# Patient Record
(demographics unavailable — no encounter records)

---

## 2025-02-21 NOTE — HISTORY OF PRESENT ILLNESS
[FreeTextEntry1] : Normally takes Adderall approximately 5 days per week when working his job to keep focus.    Transthoracic echocardiogram from 2/8/2022 showed normal cardiac chamber sizes with normal systolic function of the left ventricle and normal ejection fraction of 60 to 65%.  There was borderline enlargement of the left atrium, and right atrium.  There was trace MR and PI and no pericardial effusion;

## 2025-02-21 NOTE — REASON FOR VISIT
[Symptom and Test Evaluation] : symptom and test evaluation [Arrhythmia/ECG Abnorrmalities] : arrhythmia/ECG abnormalities [FreeTextEntry3] : N A [FreeTextEntry1] : The patient is a 35-year-old young man who has a history for occasional intermittent palpitations in the chest experienced when he's been seen over the past many years.  Admits to having anxiety but has never been treated for this.  He's also had a longtime history with ADHD for which he's been taking Adderall for many years.  He's on NO other prescription medications.  Back in January 2022, he had a COVID-19 infection for which lasted several days and he gradually improved.  Subsequently he had noticed a few fleeting palpitations back then but no associated chest pain, shortness of breath, dizziness or syncope;  Patient returns to the office today for a general cardiac checkup after being lost to follow up for almost 3 years.    Unfortunately, he's not been feeling very good since late December with complaints of occasional right sided chest discomfort that sometimes radiates across his chest to the left side.  This can come on while at rest twisting and turning his upper body at home, and sometimes it comes on with minimal exertion.   He's also noticed an increase in dyspnea on exertion when exercising at the gym and when walking up the stairs from the subways in [place], [place].  He's also noticed a warm feeling come over him and his legs feel weakened while commuting to [facility] but admits these typically come on when he has an anxiety attack and/or feeling stressed.    He admits that his symptoms appear to have improved some after holding his Adderall since late December 2024, as well as stopped drinking 3 cups of coffee daily, stopped drinking Red bulls in the afternoon, and stopped smoking cigarettes (normally smokes about 5 cigs daily).  However, his symptoms are still present during these last few weeks and it's beginning to concern him.  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    Fortunately, there's been no associated diaphoresis, orthopnea, PND, syncope, edema.

## 2025-02-21 NOTE — REVIEW OF SYSTEMS
[Negative] : Heme/Lymph [Dyspnea on exertion] : dyspnea during exertion [Chest Discomfort] : chest discomfort [Palpitations] : palpitations [Anxiety] : anxiety [Under Stress] : under stress

## 2025-02-21 NOTE — PHYSICAL EXAM
[Well Developed] : well developed [Well Nourished] : well nourished [No Acute Distress] : no acute distress [Normal Conjunctiva] : normal conjunctiva [Normal Venous Pressure] : normal venous pressure [No Carotid Bruit] : no carotid bruit [Normal S1, S2] : normal S1, S2 [No Murmur] : no murmur [No Rub] : no rub [No Gallop] : no gallop [Clear Lung Fields] : clear lung fields [Good Air Entry] : good air entry [No Respiratory Distress] : no respiratory distress  [Soft] : abdomen soft [Non Tender] : non-tender [No Masses/organomegaly] : no masses/organomegaly [Normal Gait] : normal gait [No Edema] : no edema [No Cyanosis] : no cyanosis [No Clubbing] : no clubbing [No Rash] : no rash [No Skin Lesions] : no skin lesions [Moves all extremities] : moves all extremities [No Focal Deficits] : no focal deficits [Normal Speech] : normal speech [Alert and Oriented] : alert and oriented [Normal memory] : normal memory [Appears Anxious] : appears anxious [de-identified] : Well fit muscular male with large chest/shoulders.

## 2025-03-21 NOTE — HISTORY OF PRESENT ILLNESS
[FreeTextEntry1] : Normally takes Adderall approximately 5 days per week when working his job to keep focus.    Exercise Stress Test (March 6, 2025):  EKG negative for ischemia.  Patient exercised 13 METS which is consistent with good exercise capacity, and testing was stopped due to patient fatigue.  No ischemic ST segment changes during stress.  Rare PAC noted during stress; ie- negative study.   Transthoracic Echocardiogram (March 6, 2025):  LV cavity and wall thickness are normal size.  Normal LV systolic and diastolic function; LVEF 55 to 60%.  No regional wall motion abnormalities.  Left and right atria normal in size and structure.  No significant valvular disease.  No pericardial effusion seen.    Transthoracic echocardiogram from 2/8/2022 showed normal cardiac chamber sizes with normal systolic function of the left ventricle and normal ejection fraction of 60 to 65%.  There was borderline enlargement of the left atrium, and right atrium.  There was trace MR and PI and no pericardial effusion;

## 2025-03-21 NOTE — REASON FOR VISIT
[Symptom and Test Evaluation] : symptom and test evaluation [Arrhythmia/ECG Abnorrmalities] : arrhythmia/ECG abnormalities [FreeTextEntry3] : N A [FreeTextEntry1] : The patient is a 35-year-old young man who has a history for occasional intermittent palpitations in the chest experienced when he's been seen over the past many years.  Admits to having anxiety but has never been treated for this.  He's also had a longtime history with ADHD for which he's been taking Adderall for many years.  He's on NO other prescription medications.  Back in January 2022, he had a COVID-19 infection for which lasted several days and he gradually improved.  Subsequently he had noticed a few fleeting palpitations back then but no associated chest pain, shortness of breath, dizziness or syncope.    Patient returns to the office today for a general cardiac checkup, and to review results from most recent EST and TTE.     Fortunately, he's now been feeling better since his last office visit, whereas he's no longer experiencing as much occasional right sided chest discomfort that previously would radiate across his chest. This had come on while at rest twisting and turning his upper body at home, and sometimes it would come on with minimal exertion.   He's also noticed an increase in dyspnea on exertion when exercising at the gym and when walking up the stairs from the subways in [place], [place].  He's also noticed a warm feeling come over him and his legs feel weakened while commuting to [facility] but admits these typically come on when he has an anxiety attack and/or feeling stressed.    Today, patient now believes his symptoms are mostly anxiety/stress related, and he's planning on making an appointment with his PCP to discuss treatment options.    Fortunately, there's been no associated diaphoresis, orthopnea, PND, syncope, edema.

## 2025-03-21 NOTE — ASSESSMENT
[FreeTextEntry1] : EKG shows normal sinus rhythm, rate 63 bpm, rSR prime V1.  Essentially unchanged.   In summary, this 35-year-old young man who has a history for occasional intermittent palpitations in the chest experienced when he's been seen over the past many years.  Admits to having anxiety but has never been treated for this.  He's also had a longtime history with ADHD for which he's been taking Adderall for many years.  He's on NO other prescription medications.  Back in January 2022, he had a COVID-19 infection for which lasted several days and he gradually improved.  Subsequently he had noticed a few fleeting palpitations back then but no associated chest pain, shortness of breath, dizziness or syncope.    Patient returns to the office today for a general cardiac checkup, and to review results from most recent EST and TTE.     Fortunately, he's now been feeling better since his last office visit, whereas he's no longer experiencing as much occasional right sided chest discomfort that previously would radiate across his chest. This had come on while at rest twisting and turning his upper body at home, and sometimes it would come on with minimal exertion.   He's also noticed an increase in dyspnea on exertion when exercising at the gym and when walking up the stairs from the subways in [place], [place].  He's also noticed a warm feeling come over him and his legs feel weakened while commuting to [facility] but admits these typically come on when he has an anxiety attack and/or feeling stressed.    Today, patient now believes his symptoms are mostly anxiety/stress related, and he's planning on making an appointment with his PCP to discuss treatment options.    Fortunately, there's been no associated diaphoresis, orthopnea, PND, syncope, edema.   Normally takes Adderall approximately 5 days per week when working his job to keep focus.    Exercise Stress Test (March 6, 2025):  EKG negative for ischemia.  Patient exercised 13 METS which is consistent with good exercise capacity, and testing was stopped due to patient fatigue.  No ischemic ST segment changes during stress.  Rare PAC noted during stress; ie- negative study.   Transthoracic Echocardiogram (March 6, 2025):  LV cavity and wall thickness are normal size.  Normal LV systolic and diastolic function; LVEF 55 to 60%.  No regional wall motion abnormalities.  Left and right atria normal in size and structure.  No significant valvular disease.  No pericardial effusion seen.    Plan:  Patient reassured his cardiac testing modalities were unremarkable showing no signs of coronary ischemic/infarct as well as no exercise induced arrhythmias.  In addition, his echocardiogram was also unremarkable with a normal cardiac structure and function as well as no significant valvulopathy or any signs of inflammation.   Recommend patient to continue cutting back on all triggers such as Adderall, coffee and other simulants such as Red Bull, and continue with stopping cigarettes.  Keep well hydrated daily with water.   Counseled patient on importance of exercising regularly to help reduce stress/anxiety and to promote overall general health.  He will be increasing his weekly aerobic exercise activity.   Follow up with PCP (Dr. Henning) regarding routine checkups and fasting blood work including lipid panel.  Forward all testing/lab work to our office. He will be discussing potential anti-anxiety medications with his PCP in the future to hopefully reduce some of those symptoms.  (?) SSRI's, therapy, psychiatrist, etc.   Patient to report any recurrence of significant chest symptoms, shortness of breath or palpitations in the future;  Return to office with Dr. Rivera within 1 year or PRN.

## 2025-03-21 NOTE — PHYSICAL EXAM
[Well Developed] : well developed [Well Nourished] : well nourished [No Acute Distress] : no acute distress [Normal Conjunctiva] : normal conjunctiva [Normal Venous Pressure] : normal venous pressure [No Carotid Bruit] : no carotid bruit [Normal S1, S2] : normal S1, S2 [No Murmur] : no murmur [No Rub] : no rub [No Gallop] : no gallop [Clear Lung Fields] : clear lung fields [Good Air Entry] : good air entry [No Respiratory Distress] : no respiratory distress  [Soft] : abdomen soft [Non Tender] : non-tender [No Masses/organomegaly] : no masses/organomegaly [Normal Gait] : normal gait [No Edema] : no edema [No Cyanosis] : no cyanosis [No Clubbing] : no clubbing [No Rash] : no rash [No Skin Lesions] : no skin lesions [Moves all extremities] : moves all extremities [No Focal Deficits] : no focal deficits [Normal Speech] : normal speech [Alert and Oriented] : alert and oriented [Normal memory] : normal memory [Appears Anxious] : appears anxious [de-identified] : Well fit muscular male with large chest/shoulders.

## 2025-03-21 NOTE — REVIEW OF SYSTEMS
[Dyspnea on exertion] : dyspnea during exertion [Palpitations] : palpitations [Anxiety] : anxiety [Under Stress] : under stress [Negative] : Heme/Lymph